# Patient Record
Sex: MALE | Race: WHITE | NOT HISPANIC OR LATINO | URBAN - METROPOLITAN AREA
[De-identification: names, ages, dates, MRNs, and addresses within clinical notes are randomized per-mention and may not be internally consistent; named-entity substitution may affect disease eponyms.]

---

## 2023-06-14 ENCOUNTER — OFFICE VISIT (OUTPATIENT)
Dept: PRIMARY CARE | Facility: CLINIC | Age: 33
End: 2023-06-14
Payer: MEDICARE

## 2023-06-14 VITALS
OXYGEN SATURATION: 97 % | BODY MASS INDEX: 23.49 KG/M2 | DIASTOLIC BLOOD PRESSURE: 76 MMHG | WEIGHT: 155 LBS | TEMPERATURE: 97.7 F | HEART RATE: 89 BPM | SYSTOLIC BLOOD PRESSURE: 122 MMHG | HEIGHT: 68 IN

## 2023-06-14 DIAGNOSIS — Z00.00 ROUTINE ADULT HEALTH MAINTENANCE: Primary | ICD-10-CM

## 2023-06-14 PROBLEM — J45.909 ASTHMA (HHS-HCC): Status: ACTIVE | Noted: 2023-06-14

## 2023-06-14 PROBLEM — F41.9 ANXIETY: Status: ACTIVE | Noted: 2023-06-14

## 2023-06-14 PROCEDURE — 1036F TOBACCO NON-USER: CPT | Performed by: FAMILY MEDICINE

## 2023-06-14 PROCEDURE — 99385 PREV VISIT NEW AGE 18-39: CPT | Performed by: FAMILY MEDICINE

## 2023-06-14 RX ORDER — ALBUTEROL SULFATE 90 UG/1
AEROSOL, METERED RESPIRATORY (INHALATION) 4 TIMES DAILY
COMMUNITY
Start: 2015-10-20 | End: 2023-09-26 | Stop reason: SDUPTHER

## 2023-06-14 NOTE — PROGRESS NOTES
"Subjective   Patient ID: Varinder Dunlap is a 33 y.o. male who presents for New Patient Visit and Cyst (On L arm x \"long time\").  HPI patient presents to [facility] care.  He is feeling well overall.  Presents because there have been some readings on his smart watch which show elevated pulse readings on certain days.  Patient has never noticed any instances of palpitations or feeling like his heart is beating out of his chest.  Does feel some times when he stands up suddenly a little lightheaded temporarily.  Also has a small cyst on his left arm has been there for a number of years.  He thinks it is going little bit bigger but not by a lot.  It is not tender.  Otherwise no chest pain or shortness of breath no nausea vomiting diarrhea.  Has some slow bowel movements.    No current outpatient medications on file prior to visit.     No current facility-administered medications on file prior to visit.        Vitals:    06/14/23 0830   BP: 122/76   Pulse: 89   Temp: 36.5 °C (97.7 °F)   SpO2: 97%       Review of Systems   All other systems reviewed and are negative.      Objective     Physical Exam  Vitals reviewed.   Constitutional:       General: He is not in acute distress.     Appearance: Normal appearance. He is well-developed. He is not diaphoretic.   HENT:      Head: Normocephalic and atraumatic.      Right Ear: Tympanic membrane normal.      Left Ear: Tympanic membrane normal.      Nose: Nose normal.      Mouth/Throat:      Mouth: Mucous membranes are moist.   Eyes:      Pupils: Pupils are equal, round, and reactive to light.   Cardiovascular:      Rate and Rhythm: Normal rate and regular rhythm.      Heart sounds: Normal heart sounds. No murmur heard.     No friction rub. No gallop.   Pulmonary:      Effort: Pulmonary effort is normal.      Breath sounds: Normal breath sounds. No rales.   Abdominal:      General: Bowel sounds are normal.      Palpations: Abdomen is soft.      Tenderness: There is no abdominal " tenderness.   Musculoskeletal:      Cervical back: Normal range of motion and neck supple.   Skin:     General: Skin is warm and dry.   Neurological:      Mental Status: He is alert.   Psychiatric:         Mood and Affect: Mood normal.         No visits with results within 2 Month(s) from this visit.   Latest known visit with results is:   No results found for any previous visit.       Assessment/Plan   Problem List Items Addressed This Visit    None  Visit Diagnoses       Routine adult health maintenance    -  Primary    Relevant Orders    Comprehensive Metabolic Panel    Lipid Panel    CBC and Auto Differential        Suspect elevated heart rate readings are just from the equipment not functioning.  Since patient is not having any real symptoms we will hold off on further testing.  Suspect lump on arm is just a small cyst.  Checking blood work.  Encouraged to continue physical activity.

## 2023-09-26 DIAGNOSIS — J45.909 ASTHMA, UNSPECIFIED ASTHMA SEVERITY, UNSPECIFIED WHETHER COMPLICATED, UNSPECIFIED WHETHER PERSISTENT (HHS-HCC): Primary | ICD-10-CM

## 2023-09-26 RX ORDER — ALBUTEROL SULFATE 90 UG/1
2 AEROSOL, METERED RESPIRATORY (INHALATION) EVERY 4 HOURS PRN
Qty: 8 G | Refills: 0 | Status: SHIPPED | OUTPATIENT
Start: 2023-09-26

## 2023-09-26 NOTE — TELEPHONE ENCOUNTER
Pt called stating he is in [place] and need an emergency refill on his inhaler.    [facility] pt  No upcoming appts  Pt OUT  Pharmacy updated